# Patient Record
(demographics unavailable — no encounter records)

---

## 2025-05-07 NOTE — HEALTH RISK ASSESSMENT
[None] : None [With Family] : lives with family [Unemployed] : unemployed [Single] : single [# Of Children ___] : has [unfilled] children [Reviewed no changes] : Reviewed, no changes [Patient reported PAP Smear was abnormal] : Patient reported PAP Smear was abnormal [Former] : Former [Audit-CScore] : = [Change in mental status noted] : No change in mental status noted [Reports changes in hearing] : Reports no changes in hearing [Reports changes in vision] : Reports no changes in vision [PapSmearDate] : 07/24 [AdvancecareDate] : 05/25

## 2025-05-07 NOTE — COUNSELING
[Potential consequences of obesity discussed] : Potential consequences of obesity discussed [Benefits of weight loss discussed] : Benefits of weight loss discussed [Encouraged to maintain food diary] : Encouraged to maintain food diary [Encouraged to increase physical activity] : Encouraged to increase physical activity [Encouraged to use exercise tracking device] : Encouraged to use exercise tracking device [Target Wt Loss Goal ___] : Weight Loss Goals: Target weight loss goal [unfilled] lbs [Weigh Self Weekly] : weigh self weekly [Decrease Portions] : decrease portions [____ min/wk Activity] : [unfilled] min/wk activity [Needs reinforcement, provided] : Patient needs reinforcement on understanding of disease, goals and obesity follow-up plan; reinforcement was provided [FreeTextEntry4] : 15

## 2025-05-07 NOTE — HISTORY OF PRESENT ILLNESS
[FreeTextEntry1] :  establish care with a new PM [de-identified] : -PMH: Anxiety, Depression, Thyroid Nodule -SH: Single. Unemployed. Former smoker (Quit 2016). 1-2 EtOH/day.  CONSUELO is a 43 year F whom is here today to establish care with a new PMD Today, she is accompanied by her mom She mentions that she was in the ER this past Saturday for severe back pain for which she was given anti-inflammatories  states pain is improved today with medications prescribed by the ER providers  denies any le weakness or loss of sensation. denies bowel or bladder incontinence   Specialists: -OBGYN: Dr. Miller  -Vaccines:  -Pap Smear:  -FH of Colon, breast or ovarian CA: Colon CA in both Mom & Maternal Grandmother. Will need to start Colon CA screening sooner (Age 45)  -Thyroid Nodule: Had Bx in 10/2019 all benign but did comment on chronic thyroiditis -Anxiety, Depression: Was on medication in the past. She states that she continues to experience CHIDI that makes it difficult for her to obtain a job. She declines wanting to go back on medication. Denies SI/HI.

## 2025-05-07 NOTE — ASSESSMENT
[Vaccines Reviewed] : Immunizations reviewed today. Please see immunization details in the vaccine log within the immunization flowsheet.  [FreeTextEntry1] : Specialists: -OBGYN: Dr. Miller  EKG obtained in office today demonstrates NSR w/ no acute st/twave changes    Normal vital signs aside from elevated SBP and morbid obesity for which dietary/lifestyle modifications was advised.  Low-salt diet.  Will monitor closely. No abnormal findings on exam Continue on current medications for management of acute low back pain.  Begin physical therapy as recommended.  Neurosurgery consultation is recommended per ER. New or worsening symptoms patient to notify us for further recommendations Follow-up labs drawn in office today with further recommendations to come pending results Follow-up with gynecology given previously abnormal Pap smear demonstrating LSIL Patient is not up-to-date on recommended preventative screenings such as breast cancer screening and colon cancer positive family history of early onset colon cancer but she defers these at this time and prefers to address at a later date Recommend she obtain thyroid ultrasound to follow-up on previous cervical lymphadenopathy, thyroiditis and thyroid nodule I want her to return to office in 1 month sooner if needed

## 2025-05-08 NOTE — PHYSICAL EXAM
[de-identified] : Patient is awake and alert. Morbidly obese female in no acute distress, accompanied by her mother.  Patient is interactive and appropriate RLE 5/5 HF/KE/KF/DF/PF/EHL LLE 5/5 HF/KE/KF/DF/PF/EHL No tenderness of midline or bilateral paraspinal lumbar region  Sensation intact to light touch NEGATIVE CVA tenderness bilaterally  NEGATIVE straight leg raise bilaterally  Negative clonus bilaterally Narrow-based gait within normal limits reflexes 2+ [de-identified] : XRay Lumbar Spine AP & Lateral 5/2/2025, [facility]: Three views of the lumbar spine on 4 films reveal normal curvature in the lateral view with mild subluxation at L4-5. The frontal view shows mild scoliotic curvature with convex to the to the right. The vertebral bodies are satisfactory in height with no evidence of recent or old compression fracture. The pedicles, spinous processes and transverse processes and sacroiliac joints appear normal. The disc spaces show mild degenerative changes at L5-S1.  IMPRESSION: L4-5 subluxation. No evidence of vertebral body fracture.

## 2025-05-08 NOTE — HISTORY OF PRESENT ILLNESS
[de-identified] : Ms. Tamara Max is a very pleasant 44 y/o female, accompanied by her mother, with a PMHx of anxiety, depression, iron deficiency and morbid obesity, presents today for a neurosurgical consultation as referred by her PCP for ~2-week history of right sided lower back pain without any particular inciting event or injury. Approximately 2 weeks ago Ms. Max began to experience right-sided low back pain without any particular inciting event or injury, this prompted an evaluation at [facility] approximately 1 week ago where an x-ray lumbar spine AP lateral was taken.  The x-ray revealed L4-L5 anterolisthesis without any evidence of acute fracture.  She was given pain medications and followed up with her primary care doctor the next day who referred her to neurosurgery.  She reports mild improvement of her right-sided lower back pain with methocarbamol and ibuprofen and intermittently experiences her pain with certain movements.  There are times where she is completely pain-free, this is mostly when she is at rest.  She denies any shooting pain down her lower extremities, numbness, tingling or weakness.  She denies bowel/bladder incontinence or saddle anesthesia.  She denies any urinary burning or frequency.  She has experienced lower back pain in the past, which was worse with long periods of standing, she states that she had never experienced pain this severe before.  She is pending an appointment with physical therapy next week.

## 2025-05-08 NOTE — ASSESSMENT
[FreeTextEntry1] : Ms. Tamara Max is a very pleasant 44 y/o female, accompanied by her mother, with a PMHx of anxiety, depression, iron deficiency and morbid obesity, presents today for a neurosurgical consultation as referred by her PCP for ~2-week history of right sided lower back pain without any particular inciting event or injury. Overall, her right-sided lower back pain is worse with particular movements and there are times when she is pain-free particularly when at rest, she has taken ibuprofen and methocarbamol with some improvement of her symptoms as well.  She denies any emmie radiculopathy at this time. On exam she is neurologically intact with 5/5 strength in bilateral lower extremities, without any evidence of hyperreflexia, negative straight leg raise bilaterally, no tenderness of the bilateral lumbar paraspinal region, negative CVA tenderness bilaterally.  Recent x-ray lumbar spine AP and lateral obtained at [facility] ER on 5/2/2025 revealed L4-5 anterolisthesis without any evidence of vertebral body fracture.  We discussed that while she may be experiencing micromotion at the L4-L5 level, it is possible that she is not symptomatic from this/there is no movement at this level as her symptoms have had an acute onset within the last week.  We discussed that back pain is multifactorial in nature with a musculoskeletal/muscle spasm component and is best initially treated with conservative methods such as physical therapy for strengthening, stretching, range of motion exercises, massage therapy and stim as well as over-the-counter pain medications.  She would benefit from beginning physical therapy for these treatment modalities, discussed the importance of weight loss, avoidance of bending, lifting, twisting and activity modification for overall spine health.  We have provided a refill of methocarbamol 750 mg 3 times daily for 7 days she reports some improvement of her symptoms with this medication and had only a short course from the ER.  Should she require any further pain medications we discussed that she would best be evaluated by pain management provider for further treatment.  She will follow-up in approximately 4-6 weeks to discuss her overall progress, sooner if any new or worsening symptoms develop.  All questions were answered.  Plan: - Encouraged weight loss, avoidance of bending, lifting or twisting and activity modification  - Encouraged she begin physical therapy for lower back pain  - Provided a refill of Methocarbamol 750mg, discussed that if she were to require further pain medications, she would need to consult with pain management  - Follow up with Neurosurgery in 4-6 weeks, sooner if any new or worsening symptoms develop

## 2025-05-19 NOTE — ASSESSMENT
[FreeTextEntry1] : 43 y.o. F w/ h/o morbid obesity, iron deficiency anemia and thyroid nodule presents to office w/ c/o right sided LBP beginning on 4/25/25 w/o antecedent trauma or injury to L-spine.  I spent most of today's office visit (40 minutes) reviewing the patient's x-rays, discussing etiology, pathogenesis, further diagnostic workup and nonoperative versus operative management.  X-rays lumbosacral spine significant for a grade 1 anterolisthesis L4 over L5 within a background of a dextroscoliosis with an apex at L3.  I explained to the patient and her mother that I agree with the assessment of Dr. Cary that she likely experienced an exacerbation of pain from the underlying spondylolisthesis.  As the patient's pain is settling down, there is no urgent need to obtain a CT scan; although, she may need one in the future to definitively rule out bilateral L4 spondylolyses.  For now, I advised the patient to reserve the skeletal muscle relaxant for nighttime use only given its propensity for CNS depression.  I advised the patient to start her course of physical therapy for gentle range of motion, pain relieving modalities, stretching and strengthening exercises.  I further advised walking in a pool of water for weight loss and core stabilization.  I see no role for spinal injections at this time.  The patient is in agreement with the plan.  All questions have been answered.  Return to office 6 to 8 weeks.

## 2025-05-19 NOTE — PHYSICAL EXAM
[FreeTextEntry1] : NAD A&Ox3 Morbidly obese ROM L-spine: near full forward flexion w/o pain; 15-20' extension w/ discomfort; passive extension + R LR is painful ROM Hips: smooth IR/ER w/o pain Pelvic tilt: neg right Seated slump test: neg right SLR: neg right EMILY's: neg right DTR's: 2+ knees/ankles MMT: 5/5 b/l KE/DF/PF Sensation: SILT Toe & Heel Walk: Yes

## 2025-05-19 NOTE — DATA REVIEWED
[Plain X-Rays] : plain X-Rays [FreeTextEntry1] : X-rays L-spine: Three views of the lumbar spine on 4 films reveal normal curvature in the lateral view with mild subluxation at L4-5. The frontal view shows mild scoliotic curvature with convex to the to the right. The vertebral bodies are satisfactory in height with no evidence of recent or old compression fracture. The pedicles, spinous processes and transverse processes and sacroiliac joints appear normal. The disc spaces show mild degenerative changes at L5-S1.  IMPRESSION: L4-5 subluxation. No evidence of vertebral body fracture.

## 2025-05-19 NOTE — HISTORY OF PRESENT ILLNESS
[FreeTextEntry1] : 43 y.o. F w/ h/o morbid obesity, iron deficiency anemia and thyroid nodule presents to office w/ c/o right sided LBP beginning on 4/25/25 w/o antecedent trauma or injury to L-spine.  Pain got worse on 4/28/25 with difficulty getting out of bed.  Pt. states that her LBP lasted for about 2 weeks before gradual improvement in sxs.  Pt. denied radiating pain down right leg.  No N/T/B in right leg or foot.  Pt. presented to [facility] ED on 5/1/25 where x-rays L-spine obtained.  Results detailed below.  [place] [place] opined possible L4-5 instability based upon her hospital radiographs.  Pt. had initial P.T. evaluation last Thursday and is set to begin treatment later this week.

## 2025-06-02 NOTE — HISTORY OF PRESENT ILLNESS
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 Unemployed. Former smoker (Quit 2016). 1-2 EtOH/day.  CONSUELO is a 44 year F whom is Here today for follow-up back pain, morbid obesity, preventative cancer screening discussion, history of thyroiditis/thyroid nodule/cervical lymphadenopathy Since time of last visit patient consulted with neurosurgery for her back pain.  Per documentation weight loss advised along with physical therapy, as needed methocarbamol and advised to follow-up in 4 to 6 weeks sooner if needed.  Patient also consulted with pain management with similar recommendations to neurosurgery.  -Thyroid Nodule: Had Bx in 10/2019 all benign but did comment on chronic thyroiditis -Anxiety, Depression: Was on medication in the past. She states that she continues to experience CHIDI that makes it difficult for her to obtain a job. She declines wanting to go back on medication. Denies SI/HI.

## 2025-06-02 NOTE — ASSESSMENT
[FreeTextEntry1] : Specialists: -OBGYN: Dr. Miller  Normal vital signs aside from elevated DBP and morbid obesity for which dietary/lifestyle modifications was advised.  Low-salt diet.  Will monitor closely. Continue with physical therapy and follow-up with pain management as needed She still needs to obtain recommended thyroid ultrasound to follow-up on previous cervical lymphadenopathy, thyroiditis and thyroid nodule Follow-up with gynecology given previously abnormal Pap smear demonstrating LSIL States she is scheduled to obtain a mammogram Defers pursuing any colon cancer screenings at this time.  I did provide her with a gastroenterology referral in case she changes her mind in between now And when I see her back in 3 months [employer] bus paperwork completed in office today She continues to defer away from initiation of any medication to treat her underlying anxiety and depression.  Should she change her mind she will let me know.  She also defers consultation with psychiatry We talked about ways to help improve her weight.  Patient is interested in consulting with a medication weight .  Referral provided